# Patient Record
Sex: FEMALE | Race: WHITE | Employment: PART TIME | URBAN - METROPOLITAN AREA
[De-identification: names, ages, dates, MRNs, and addresses within clinical notes are randomized per-mention and may not be internally consistent; named-entity substitution may affect disease eponyms.]

---

## 2018-03-26 PROBLEM — J30.9 ALLERGIC RHINITIS: Status: ACTIVE | Noted: 2018-03-26

## 2018-03-26 PROBLEM — R42 DISEQUILIBRIUM: Status: ACTIVE | Noted: 2018-03-26

## 2019-04-22 ENCOUNTER — ANESTHESIA EVENT (OUTPATIENT)
Dept: SURGERY | Facility: HOSPITAL | Age: 59
DRG: 419 | End: 2019-04-22
Payer: COMMERCIAL

## 2019-04-22 ENCOUNTER — HOSPITAL ENCOUNTER (INPATIENT)
Facility: HOSPITAL | Age: 59
LOS: 2 days | Discharge: HOME | DRG: 419 | End: 2019-04-24
Attending: SURGERY | Admitting: SURGERY
Payer: COMMERCIAL

## 2019-04-22 DIAGNOSIS — K81.9 CHOLECYSTITIS: ICD-10-CM

## 2019-04-22 PROBLEM — K81.0 ACUTE CHOLECYSTITIS: Status: ACTIVE | Noted: 2019-04-22

## 2019-04-22 RX ORDER — KETOROLAC TROMETHAMINE 30 MG/ML
30 INJECTION, SOLUTION INTRAMUSCULAR; INTRAVENOUS EVERY 6 HOURS PRN
Status: DISCONTINUED | OUTPATIENT
Start: 2019-04-22 | End: 2019-04-23 | Stop reason: HOSPADM

## 2019-04-22 RX ORDER — MORPHINE SULFATE 4 MG/ML
2 INJECTION, SOLUTION INTRAMUSCULAR; INTRAVENOUS EVERY 2 HOUR PRN
Status: DISCONTINUED | OUTPATIENT
Start: 2019-04-22 | End: 2019-04-23 | Stop reason: HOSPADM

## 2019-04-22 RX ORDER — SODIUM CHLORIDE 9 MG/ML
INJECTION, SOLUTION INTRAVENOUS CONTINUOUS
Status: DISCONTINUED | OUTPATIENT
Start: 2019-04-22 | End: 2019-04-23 | Stop reason: HOSPADM

## 2019-04-22 RX ORDER — KETOROLAC TROMETHAMINE 15 MG/ML
15 INJECTION, SOLUTION INTRAMUSCULAR; INTRAVENOUS EVERY 6 HOURS PRN
Status: DISCONTINUED | OUTPATIENT
Start: 2019-04-22 | End: 2019-04-23 | Stop reason: HOSPADM

## 2019-04-22 RX ORDER — MORPHINE SULFATE 4 MG/ML
1 INJECTION, SOLUTION INTRAMUSCULAR; INTRAVENOUS EVERY 2 HOUR PRN
Status: DISCONTINUED | OUTPATIENT
Start: 2019-04-22 | End: 2019-04-23 | Stop reason: HOSPADM

## 2019-04-22 RX ORDER — ACETAMINOPHEN 325 MG/1
650 TABLET ORAL EVERY 6 HOURS PRN
Status: DISCONTINUED | OUTPATIENT
Start: 2019-04-22 | End: 2019-04-23 | Stop reason: HOSPADM

## 2019-04-22 RX ORDER — ONDANSETRON 2 MG/ML
4 INJECTION INTRAMUSCULAR; INTRAVENOUS EVERY 6 HOURS PRN
Status: DISCONTINUED | OUTPATIENT
Start: 2019-04-22 | End: 2019-04-23 | Stop reason: HOSPADM

## 2019-04-22 RX ORDER — MORPHINE SULFATE 4 MG/ML
4 INJECTION, SOLUTION INTRAMUSCULAR; INTRAVENOUS EVERY 2 HOUR PRN
Status: DISCONTINUED | OUTPATIENT
Start: 2019-04-22 | End: 2019-04-23 | Stop reason: HOSPADM

## 2019-04-22 RX ORDER — METOCLOPRAMIDE HYDROCHLORIDE 5 MG/ML
10 INJECTION INTRAMUSCULAR; INTRAVENOUS EVERY 8 HOURS PRN
Status: DISCONTINUED | OUTPATIENT
Start: 2019-04-22 | End: 2019-04-23 | Stop reason: HOSPADM

## 2019-04-22 NOTE — PLAN OF CARE
Problem: Patient/Family Goals  Goal: Patient/Family Long Term Goal  Description  Patient's Long Term Goal: \"to go home and be back to normal\"    Interventions:  - Offer PRN meds to keep pain and nausea controlled  - See additional Care Plan goals for s health  - Refer to Case Management Department for coordinating discharge planning if the patient needs post-hospital services based on physician/LIP order or complex needs related to functional status, cognitive ability or social support system  Outcome: P

## 2019-04-22 NOTE — PLAN OF CARE
NURSING ADMISSION NOTE      Patient admitted via w/c as a direct admit from Immediate Care  Oriented to room. Safety precautions initiated. Bed in low position. Call light in reach. Pt A/Ox4. VSS, afebrile.  Pt rates pain to abdomen 5/10, declinin

## 2019-04-23 ENCOUNTER — APPOINTMENT (OUTPATIENT)
Dept: GENERAL RADIOLOGY | Facility: HOSPITAL | Age: 59
DRG: 419 | End: 2019-04-23
Attending: SURGERY
Payer: COMMERCIAL

## 2019-04-23 ENCOUNTER — ANESTHESIA (OUTPATIENT)
Dept: SURGERY | Facility: HOSPITAL | Age: 59
DRG: 419 | End: 2019-04-23
Payer: COMMERCIAL

## 2019-04-23 PROCEDURE — 88304 TISSUE EXAM BY PATHOLOGIST: CPT | Performed by: SURGERY

## 2019-04-23 PROCEDURE — 0FT44ZZ RESECTION OF GALLBLADDER, PERCUTANEOUS ENDOSCOPIC APPROACH: ICD-10-PCS | Performed by: SURGERY

## 2019-04-23 PROCEDURE — 74300 X-RAY BILE DUCTS/PANCREAS: CPT | Performed by: SURGERY

## 2019-04-23 PROCEDURE — BF131ZZ FLUOROSCOPY OF GALLBLADDER AND BILE DUCTS USING LOW OSMOLAR CONTRAST: ICD-10-PCS | Performed by: SURGERY

## 2019-04-23 PROCEDURE — 85027 COMPLETE CBC AUTOMATED: CPT | Performed by: INTERNAL MEDICINE

## 2019-04-23 PROCEDURE — 80053 COMPREHEN METABOLIC PANEL: CPT | Performed by: INTERNAL MEDICINE

## 2019-04-23 PROCEDURE — 85610 PROTHROMBIN TIME: CPT | Performed by: INTERNAL MEDICINE

## 2019-04-23 RX ORDER — ONDANSETRON 2 MG/ML
4 INJECTION INTRAMUSCULAR; INTRAVENOUS AS NEEDED
Status: DISCONTINUED | OUTPATIENT
Start: 2019-04-23 | End: 2019-04-23 | Stop reason: HOSPADM

## 2019-04-23 RX ORDER — DEXAMETHASONE SODIUM PHOSPHATE 4 MG/ML
4 VIAL (ML) INJECTION AS NEEDED
Status: DISCONTINUED | OUTPATIENT
Start: 2019-04-23 | End: 2019-04-23 | Stop reason: HOSPADM

## 2019-04-23 RX ORDER — SODIUM CHLORIDE, SODIUM LACTATE, POTASSIUM CHLORIDE, CALCIUM CHLORIDE 600; 310; 30; 20 MG/100ML; MG/100ML; MG/100ML; MG/100ML
INJECTION, SOLUTION INTRAVENOUS CONTINUOUS
Status: DISCONTINUED | OUTPATIENT
Start: 2019-04-23 | End: 2019-04-23 | Stop reason: HOSPADM

## 2019-04-23 RX ORDER — HEPARIN SODIUM 5000 [USP'U]/ML
5000 INJECTION, SOLUTION INTRAVENOUS; SUBCUTANEOUS EVERY 12 HOURS SCHEDULED
Status: DISCONTINUED | OUTPATIENT
Start: 2019-04-23 | End: 2019-04-24

## 2019-04-23 RX ORDER — HYDROMORPHONE HYDROCHLORIDE 1 MG/ML
1.2 INJECTION, SOLUTION INTRAMUSCULAR; INTRAVENOUS; SUBCUTANEOUS EVERY 2 HOUR PRN
Status: DISCONTINUED | OUTPATIENT
Start: 2019-04-23 | End: 2019-04-24

## 2019-04-23 RX ORDER — HYDROMORPHONE HYDROCHLORIDE 1 MG/ML
0.8 INJECTION, SOLUTION INTRAMUSCULAR; INTRAVENOUS; SUBCUTANEOUS EVERY 2 HOUR PRN
Status: DISCONTINUED | OUTPATIENT
Start: 2019-04-23 | End: 2019-04-24

## 2019-04-23 RX ORDER — METOCLOPRAMIDE HYDROCHLORIDE 5 MG/ML
10 INJECTION INTRAMUSCULAR; INTRAVENOUS AS NEEDED
Status: DISCONTINUED | OUTPATIENT
Start: 2019-04-23 | End: 2019-04-23 | Stop reason: HOSPADM

## 2019-04-23 RX ORDER — HYDROMORPHONE HYDROCHLORIDE 1 MG/ML
0.4 INJECTION, SOLUTION INTRAMUSCULAR; INTRAVENOUS; SUBCUTANEOUS EVERY 2 HOUR PRN
Status: DISCONTINUED | OUTPATIENT
Start: 2019-04-23 | End: 2019-04-24

## 2019-04-23 RX ORDER — HYDROCODONE BITARTRATE AND ACETAMINOPHEN 5; 325 MG/1; MG/1
1 TABLET ORAL EVERY 4 HOURS PRN
Status: DISCONTINUED | OUTPATIENT
Start: 2019-04-23 | End: 2019-04-24

## 2019-04-23 RX ORDER — KETOROLAC TROMETHAMINE 15 MG/ML
15 INJECTION, SOLUTION INTRAMUSCULAR; INTRAVENOUS EVERY 6 HOURS PRN
Status: DISCONTINUED | OUTPATIENT
Start: 2019-04-23 | End: 2019-04-24

## 2019-04-23 RX ORDER — BUPIVACAINE HYDROCHLORIDE AND EPINEPHRINE 5; 5 MG/ML; UG/ML
INJECTION, SOLUTION EPIDURAL; INTRACAUDAL; PERINEURAL AS NEEDED
Status: DISCONTINUED | OUTPATIENT
Start: 2019-04-23 | End: 2019-04-23 | Stop reason: HOSPADM

## 2019-04-23 RX ORDER — CEFOXITIN 2 G/1
INJECTION, POWDER, FOR SOLUTION INTRAVENOUS
Status: DISCONTINUED | OUTPATIENT
Start: 2019-04-23 | End: 2019-04-23 | Stop reason: HOSPADM

## 2019-04-23 RX ORDER — NALOXONE HYDROCHLORIDE 0.4 MG/ML
80 INJECTION, SOLUTION INTRAMUSCULAR; INTRAVENOUS; SUBCUTANEOUS AS NEEDED
Status: DISCONTINUED | OUTPATIENT
Start: 2019-04-23 | End: 2019-04-23 | Stop reason: HOSPADM

## 2019-04-23 RX ORDER — HYDROMORPHONE HYDROCHLORIDE 1 MG/ML
0.4 INJECTION, SOLUTION INTRAMUSCULAR; INTRAVENOUS; SUBCUTANEOUS EVERY 5 MIN PRN
Status: DISCONTINUED | OUTPATIENT
Start: 2019-04-23 | End: 2019-04-23 | Stop reason: HOSPADM

## 2019-04-23 RX ORDER — KETOROLAC TROMETHAMINE 30 MG/ML
30 INJECTION, SOLUTION INTRAMUSCULAR; INTRAVENOUS EVERY 6 HOURS PRN
Status: DISCONTINUED | OUTPATIENT
Start: 2019-04-23 | End: 2019-04-24

## 2019-04-23 RX ORDER — ONDANSETRON 2 MG/ML
4 INJECTION INTRAMUSCULAR; INTRAVENOUS EVERY 6 HOURS PRN
Status: DISCONTINUED | OUTPATIENT
Start: 2019-04-23 | End: 2019-04-24

## 2019-04-23 RX ORDER — DEXTROSE, SODIUM CHLORIDE, AND POTASSIUM CHLORIDE 5; .45; .15 G/100ML; G/100ML; G/100ML
INJECTION INTRAVENOUS CONTINUOUS
Status: DISCONTINUED | OUTPATIENT
Start: 2019-04-23 | End: 2019-04-24

## 2019-04-23 RX ORDER — HYDROCODONE BITARTRATE AND ACETAMINOPHEN 5; 325 MG/1; MG/1
2 TABLET ORAL EVERY 4 HOURS PRN
Status: DISCONTINUED | OUTPATIENT
Start: 2019-04-23 | End: 2019-04-24

## 2019-04-23 RX ORDER — METOCLOPRAMIDE HYDROCHLORIDE 5 MG/ML
10 INJECTION INTRAMUSCULAR; INTRAVENOUS EVERY 6 HOURS PRN
Status: DISCONTINUED | OUTPATIENT
Start: 2019-04-23 | End: 2019-04-24

## 2019-04-23 NOTE — OPERATIVE REPORT
Report of Operation    Kal Bell Patient Status:  Inpatient    1960 MRN [id number]   [facility] SURGERY Attending Asia Kramer MD   [address] Day # 1 PCP Cresencio Patel MD         2019    Kal Bell    PRE-OPER removed and the cystic duct was divided. The cystic artery and its branches were identified clipped and divided. The gallbladder was removed from the liver edge by taking down its peritoneal attachments with hook electrocautery.  Gallbladder was then delive

## 2019-04-23 NOTE — CONSULTS
[facility]  Report of Consultation    Barceloneta Chimrichard Patient Status:  Inpatient    1960 MRN [id number]   [facility] 3NW-A Attending Ashanti Chaudhari MD   [facility] Day # 1 PCP Erma Stevens MD     2019    Reason for Consult Allergies    Current Medications:      Current Facility-Administered Medications:   •  0.9%  NaCl infusion, , Intravenous, Continuous  •  acetaminophen (TYLENOL) tab 650 mg, 650 mg, Oral, Q6H PRN  •  morphINE sulfate (PF) 4 MG/ML injection 1 mg, 1 mg, Intr 232 172.0   INR  --  1.05       Recent Labs   Lab 04/22/19  1327 04/23/19  0505    143   K 4.00 3.9    112   CO2 28.4 27.0   BUN 9.0 9   CREATSERUM 0.81 0.88   GLU 96 82   CA  --  8.4*       Recent Labs   Lab 04/22/19  1327 04/23/19  0505   ALT Problem List:    Patient Active Problem List:     Fatigue     Myalgia     Costochondritis     Fibromyalgia     Wrist sprain, left, subsequent encounter     Adhesive capsulitis of left shoulder     Rotator cuff syndrome, left     Disequilibrium     Cali

## 2019-04-23 NOTE — PLAN OF CARE
Problem: Patient/Family Goals  Goal: Patient/Family Long Term Goal  Description  Patient's Long Term Goal: WANTS TO GO HOME     Interventions:  - NPO   - See additional Care Plan goals for specific interventions  Outcome: Progressing  Goal: Patient/Famil physician/LIP order or complex needs related to functional status, cognitive ability or social support system  Outcome: Progressing     Problem: GASTROINTESTINAL - ADULT  Goal: Minimal or absence of nausea and vomiting  Description  INTERVENTIONS:  - Maint

## 2019-04-23 NOTE — PROGRESS NOTES
[facility] Hospitalist Progress Note     Tyra Mann Patient Status:  Inpatient    1960 MRN [id number]   [facility] 3NW-A Attending Sara Starkey MD   Hosp Day # 1 PCP Win Siddiqi MD     CC: follow up    SUBJECTIVE:  S/p lap Medication:HYDROmorphone HCl **OR** HYDROmorphone HCl **OR** HYDROmorphone HCl, ondansetron HCl, Metoclopramide HCl, ketorolac (TORADOL) injection **OR** ketorolac (TORADOL) injection, HYDROcodone-acetaminophen **OR** HYDROcodone-acetaminophen        Asses

## 2019-04-23 NOTE — H&P
[facility] Hospitalist H&P       CC: No chief complaint on file. PCP: Linda Benavides MD    History of Present Illness:  Pt is a 62year old female who presented to [facility] with RUQ abd pain. Onset yesterday. A/w nausea and more frequent stools.  Preceding shortness of breath, syncope.        OBJECTIVE:   04/22/19  1731   BP: 119/71   BP Location: Left arm   Pulse: 59   Resp: 18   Temp: 98.3 °F (36.8 °C)   TempSrc: Oral   SpO2: 96%         Wt Readings from Last 6 Encounters:  04/22/19 : 150 lb (68 kg)  04/22/ divisum. SPLEEN:  Calcified splenic granulomas. ADRENALS:  Normal. KIDNEYS/URETERS:  Normal. AORTA/VASCULAR:  Normal. LYMPH NODES:  No retroperitoneal or pelvic lymphadenopathy.  GASTROINTESTINAL TRACT:  Normal. PERITONEUM:Normal. PELVIC ORGANS:  Retroverte

## 2019-04-23 NOTE — PLAN OF CARE
Problem: PAIN - ADULT  Goal: Verbalizes/displays adequate comfort level or patient's stated pain goal  Description  INTERVENTIONS:  - Encourage pt to monitor pain and request assistance  - Assess pain using appropriate pain scale  - Administer analges medications  - Provide nonpharmacologic comfort measures as appropriate  - Advance diet as tolerated, if ordered  - Obtain nutritional consult as needed  - Evaluate fluid balance  Outcome: Progressing  Goal: Maintains or returns to baseline bowel function

## 2019-04-23 NOTE — ANESTHESIA PREPROCEDURE EVALUATION
PRE-OP EVALUATION    Patient Name: Aparna Bobby    Pre-op Diagnosis: Cholecystitis [K81.9]    Procedure(s):  LAPAROSCOPIC CHOLECYSTECTOMY POSSIBLE OPEN    Surgeon(s) and Role:     Riky Blas MD - Primary    Pre-op vitals reviewed. Temp: 98.3 °F (36. BIOPSY OF BREAST, NEEDLE CORE  1994    R Breast   • COLONOSCOPY, POSSIBLE BIOPSY, POSSIBLE POLYPECTOMY 33916 N/A 9/12/2015    Performed by Enrike Quintero MD at [address] [place] [address]    Missed AB   • NEEDLE BIOPSY RIGHT  age 36    No sc

## 2019-04-23 NOTE — PAYOR COMM NOTE
--------------  ADMISSION REVIEW     Payor: [facility]  Subscriber #:  [id number]  Authorization Number: [id number]    Admit date: 4/22/19  Admit time: 1627       Admitting Physician: Marissa Merritt MD  Attending Physician:  Annel Davey MD  Primary Car zosyn  - PRN pain/nausea control       4/23/19 -     SURGERY CONSULT    History of Present Illness:     Deanna Maria is a a(n) 62year old female.  Patient complains of lethargy and loose stools starting Thursday, after lunch on Easter she noted bloating/

## 2019-04-23 NOTE — PROGRESS NOTES
RECEIVED PATIENT FROM RECOVERY ROOM . ALERT AND ORIENT X 4 , ON ROOM AIR , VITALS STABLE , VERY SLEEPY , EASILY AWAKE . PLAN  OF CARE DISCUSSED AND ANSWERED ALL QUESTIONS . VERBALIZED UNDERSTANDING .  WILL CONTINUE TO MONITOR

## 2019-04-23 NOTE — ANESTHESIA POSTPROCEDURE EVALUATION
[address] [facility] Patient Status:  Inpatient   Age/Gender 62year old female MRN [id number]   Location [address] [facility] Attending Javier Mays MD   [facility] Day # 1 PCP Geoff Cross MD       Anesthesia Post-o

## 2019-04-24 VITALS
RESPIRATION RATE: 18 BRPM | OXYGEN SATURATION: 98 % | DIASTOLIC BLOOD PRESSURE: 51 MMHG | SYSTOLIC BLOOD PRESSURE: 93 MMHG | HEART RATE: 65 BPM | TEMPERATURE: 98 F | WEIGHT: 127.19 LBS | BODY MASS INDEX: 21 KG/M2

## 2019-04-24 PROCEDURE — 85027 COMPLETE CBC AUTOMATED: CPT | Performed by: INTERNAL MEDICINE

## 2019-04-24 RX ORDER — HYDROCODONE BITARTRATE AND ACETAMINOPHEN 5; 325 MG/1; MG/1
1 TABLET ORAL EVERY 4 HOURS PRN
Qty: 20 TABLET | Refills: 0 | Status: SHIPPED | OUTPATIENT
Start: 2019-04-24 | End: 2019-05-03

## 2019-04-24 NOTE — PLAN OF CARE
Problem: Patient/Family Goals  Goal: Patient/Family Long Term Goal  Description  Patient's Long Term Goal:       - See additional Care Plan goals for specific interventions  Outcome: Progressing     Problem: PAIN - ADULT  Goal: Verbalizes/displays [place] vomiting  Description  INTERVENTIONS:  - Maintain adequate hydration with IV or PO as ordered and tolerated  - Nasogastric tube to low intermittent suction as ordered  - Evaluate effectiveness of ordered antiemetic medications  - Provide nonpharmacologic c

## 2019-04-24 NOTE — PROGRESS NOTES
[facility]  Progress Note    Katiuksa Gear Patient Status:  Inpatient    1960 MRN [id number]   [facility] 3NW-A Attending Corwin Frausto, *   Hosp Day # 2 PCP Oskar Delcid MD     Subjective:    Patient tolerating P

## 2019-04-24 NOTE — DISCHARGE SUMMARY
General Medicine Discharge Summary     Patient ID:  Tyra Mann  62year old  [id number]  11/1/1960    Admit date: 4/22/2019    Discharge date and time:  4/24/19    Attending Physician: Meghna Xiong, *     Primary Care Physician: Katey Briseno IP CONSULT TO RESPIRATORY CARE    Radiology:  Xr Oper Cholangiogram (cpt=74300)    Result Date: 4/23/2019  PROCEDURE:  XR OPER CHOLANGIOGRAM (CPT=74300)  COMPARISON:  None.   INDICATIONS:  lap anny  DESCRIPTION OF PROCEDURE:  Witnessed verbal and written i No retroperitoneal or pelvic lymphadenopathy. GASTROINTESTINAL TRACT:  Normal. PERITONEUM:Normal. PELVIC ORGANS:  Retroverted uterus with intramural leiomyoma at the uterine fundus which is diffusely hypodense measuring 2.4 cm.  BLADDER:Normal. ABDOMINAL WA

## 2019-04-24 NOTE — PLAN OF CARE
Problem: Patient/Family Goals  Goal: Patient/Family Long Term Goal  Description  Patient's Long Term Goal: wants to go home     Interventions:  - diet advanced as tolerate   - See additional Care Plan goals for specific interventions  Outcome: Adequate f Department for coordinating discharge planning if the patient needs post-hospital services based on physician/LIP order or complex needs related to functional status, cognitive ability or social support system  Outcome: Adequate for Discharge     Problem:

## 2019-04-24 NOTE — PAYOR COMM NOTE
--------------  CONTINUED STAY REVIEW    Taylorzoya Aceveseker [facility]  Subscriber #:  [id number]  Authorization Number: [id number]    Admit date: 4/22/19  Admit time: 1627    Admitting Physician: Cachorro Starkey MD  Attending Physician:  Brandie Byers MD  Primary Ca

## 2019-04-24 NOTE — PROGRESS NOTES
NURSING DISCHARGE NOTE    Discharged Home via Wheelchair. Accompanied by Family member and Support staff  Belongings Taken by patient/family discussed d/c instructions with patient and family . Answered all questions . Verbalized understanding .  Pharm

## 2019-04-25 NOTE — PAYOR COMM NOTE
--------------  DISCHARGE REVIEW    Payor: [employer]  Subscriber #:  [id number]  Authorization Number: [id number]    Admit date: 4/22/19  Admit time:  1627  Discharge Date: 4/24/2019 11:40 AM     Admitting Physician: Cachorro Starkey MD  Primary Care Physicia

## 2019-04-28 ENCOUNTER — APPOINTMENT (OUTPATIENT)
Dept: CT IMAGING | Facility: HOSPITAL | Age: 59
DRG: 948 | End: 2019-04-28
Attending: EMERGENCY MEDICINE
Payer: COMMERCIAL

## 2019-04-28 ENCOUNTER — HOSPITAL ENCOUNTER (INPATIENT)
Facility: HOSPITAL | Age: 59
LOS: 1 days | Discharge: HOME | DRG: 948 | End: 2019-04-29
Attending: EMERGENCY MEDICINE | Admitting: INTERNAL MEDICINE
Payer: COMMERCIAL

## 2019-04-28 ENCOUNTER — APPOINTMENT (OUTPATIENT)
Dept: GENERAL RADIOLOGY | Facility: HOSPITAL | Age: 59
DRG: 948 | End: 2019-04-28
Attending: EMERGENCY MEDICINE
Payer: COMMERCIAL

## 2019-04-28 ENCOUNTER — APPOINTMENT (OUTPATIENT)
Dept: MRI IMAGING | Facility: HOSPITAL | Age: 59
DRG: 948 | End: 2019-04-28
Attending: EMERGENCY MEDICINE
Payer: COMMERCIAL

## 2019-04-28 DIAGNOSIS — R79.89 ELEVATED LFTS: Primary | ICD-10-CM

## 2019-04-28 DIAGNOSIS — R07.89 CHEST PAIN, ATYPICAL: ICD-10-CM

## 2019-04-28 PROBLEM — R73.9 HYPERGLYCEMIA: Status: ACTIVE | Noted: 2019-04-28

## 2019-04-28 PROCEDURE — 74160 CT ABDOMEN W/CONTRAST: CPT | Performed by: EMERGENCY MEDICINE

## 2019-04-28 PROCEDURE — 71275 CT ANGIOGRAPHY CHEST: CPT | Performed by: EMERGENCY MEDICINE

## 2019-04-28 PROCEDURE — 83690 ASSAY OF LIPASE: CPT | Performed by: EMERGENCY MEDICINE

## 2019-04-28 PROCEDURE — 99285 EMERGENCY DEPT VISIT HI MDM: CPT

## 2019-04-28 PROCEDURE — 76376 3D RENDER W/INTRP POSTPROCES: CPT | Performed by: EMERGENCY MEDICINE

## 2019-04-28 PROCEDURE — 96375 TX/PRO/DX INJ NEW DRUG ADDON: CPT

## 2019-04-28 PROCEDURE — 85025 COMPLETE CBC W/AUTO DIFF WBC: CPT

## 2019-04-28 PROCEDURE — 80053 COMPREHEN METABOLIC PANEL: CPT | Performed by: EMERGENCY MEDICINE

## 2019-04-28 PROCEDURE — 84484 ASSAY OF TROPONIN QUANT: CPT | Performed by: HOSPITALIST

## 2019-04-28 PROCEDURE — 93005 ELECTROCARDIOGRAM TRACING: CPT

## 2019-04-28 PROCEDURE — 84484 ASSAY OF TROPONIN QUANT: CPT | Performed by: EMERGENCY MEDICINE

## 2019-04-28 PROCEDURE — 71045 X-RAY EXAM CHEST 1 VIEW: CPT | Performed by: EMERGENCY MEDICINE

## 2019-04-28 PROCEDURE — 96374 THER/PROPH/DIAG INJ IV PUSH: CPT

## 2019-04-28 PROCEDURE — 93010 ELECTROCARDIOGRAM REPORT: CPT

## 2019-04-28 PROCEDURE — 85025 COMPLETE CBC W/AUTO DIFF WBC: CPT | Performed by: EMERGENCY MEDICINE

## 2019-04-28 PROCEDURE — 96361 HYDRATE IV INFUSION ADD-ON: CPT

## 2019-04-28 PROCEDURE — 74181 MRI ABDOMEN W/O CONTRAST: CPT | Performed by: EMERGENCY MEDICINE

## 2019-04-28 RX ORDER — SODIUM CHLORIDE 9 MG/ML
1000 INJECTION, SOLUTION INTRAVENOUS ONCE
Status: COMPLETED | OUTPATIENT
Start: 2019-04-28 | End: 2019-04-28

## 2019-04-28 RX ORDER — HYDROMORPHONE HYDROCHLORIDE 1 MG/ML
0.5 INJECTION, SOLUTION INTRAMUSCULAR; INTRAVENOUS; SUBCUTANEOUS ONCE
Status: COMPLETED | OUTPATIENT
Start: 2019-04-28 | End: 2019-04-28

## 2019-04-28 RX ORDER — HYDROMORPHONE HYDROCHLORIDE 1 MG/ML
0.5 INJECTION, SOLUTION INTRAMUSCULAR; INTRAVENOUS; SUBCUTANEOUS EVERY 30 MIN PRN
Status: ACTIVE | OUTPATIENT
Start: 2019-04-28 | End: 2019-04-28

## 2019-04-28 RX ORDER — LORAZEPAM 2 MG/ML
0.5 INJECTION INTRAMUSCULAR ONCE
Status: COMPLETED | OUTPATIENT
Start: 2019-04-28 | End: 2019-04-28

## 2019-04-28 RX ORDER — SODIUM CHLORIDE 9 MG/ML
INJECTION, SOLUTION INTRAVENOUS CONTINUOUS
Status: ACTIVE | OUTPATIENT
Start: 2019-04-28 | End: 2019-04-28

## 2019-04-28 RX ORDER — ACETAMINOPHEN 325 MG/1
650 TABLET ORAL EVERY 6 HOURS PRN
Status: DISCONTINUED | OUTPATIENT
Start: 2019-04-28 | End: 2019-04-29

## 2019-04-28 RX ORDER — MORPHINE SULFATE 4 MG/ML
4 INJECTION, SOLUTION INTRAMUSCULAR; INTRAVENOUS EVERY 2 HOUR PRN
Status: DISCONTINUED | OUTPATIENT
Start: 2019-04-28 | End: 2019-04-29

## 2019-04-28 RX ORDER — HEPARIN SODIUM 5000 [USP'U]/ML
5000 INJECTION, SOLUTION INTRAVENOUS; SUBCUTANEOUS EVERY 8 HOURS SCHEDULED
Status: DISCONTINUED | OUTPATIENT
Start: 2019-04-28 | End: 2019-04-29

## 2019-04-28 RX ORDER — IBUPROFEN 400 MG/1
400 TABLET ORAL EVERY 6 HOURS PRN
Status: DISCONTINUED | OUTPATIENT
Start: 2019-04-28 | End: 2019-04-29

## 2019-04-28 RX ORDER — FLUTICASONE PROPIONATE 50 MCG
1 SPRAY, SUSPENSION (ML) NASAL DAILY
Status: DISCONTINUED | OUTPATIENT
Start: 2019-04-29 | End: 2019-04-29

## 2019-04-28 RX ORDER — METOCLOPRAMIDE HYDROCHLORIDE 5 MG/ML
10 INJECTION INTRAMUSCULAR; INTRAVENOUS EVERY 8 HOURS PRN
Status: DISCONTINUED | OUTPATIENT
Start: 2019-04-28 | End: 2019-04-29

## 2019-04-28 RX ORDER — POLYETHYLENE GLYCOL 3350 17 G/17G
17 POWDER, FOR SOLUTION ORAL DAILY PRN
Status: DISCONTINUED | OUTPATIENT
Start: 2019-04-28 | End: 2019-04-29

## 2019-04-28 RX ORDER — SODIUM CHLORIDE 9 MG/ML
INJECTION, SOLUTION INTRAVENOUS CONTINUOUS
Status: DISCONTINUED | OUTPATIENT
Start: 2019-04-28 | End: 2019-04-29

## 2019-04-28 RX ORDER — SODIUM PHOSPHATE, DIBASIC AND SODIUM PHOSPHATE, MONOBASIC 7; 19 G/133ML; G/133ML
1 ENEMA RECTAL ONCE AS NEEDED
Status: DISCONTINUED | OUTPATIENT
Start: 2019-04-28 | End: 2019-04-29

## 2019-04-28 RX ORDER — MORPHINE SULFATE 4 MG/ML
1 INJECTION, SOLUTION INTRAMUSCULAR; INTRAVENOUS EVERY 2 HOUR PRN
Status: DISCONTINUED | OUTPATIENT
Start: 2019-04-28 | End: 2019-04-29

## 2019-04-28 RX ORDER — DOCUSATE SODIUM 100 MG/1
100 CAPSULE, LIQUID FILLED ORAL 2 TIMES DAILY
Status: DISCONTINUED | OUTPATIENT
Start: 2019-04-28 | End: 2019-04-29

## 2019-04-28 RX ORDER — ONDANSETRON 2 MG/ML
4 INJECTION INTRAMUSCULAR; INTRAVENOUS EVERY 6 HOURS PRN
Status: DISCONTINUED | OUTPATIENT
Start: 2019-04-28 | End: 2019-04-29

## 2019-04-28 RX ORDER — BISACODYL 10 MG
10 SUPPOSITORY, RECTAL RECTAL
Status: DISCONTINUED | OUTPATIENT
Start: 2019-04-28 | End: 2019-04-29

## 2019-04-28 RX ORDER — IBUPROFEN 400 MG/1
400 TABLET ORAL EVERY 8 HOURS PRN
Status: DISCONTINUED | OUTPATIENT
Start: 2019-04-28 | End: 2019-04-28

## 2019-04-28 RX ORDER — ONDANSETRON 2 MG/ML
4 INJECTION INTRAMUSCULAR; INTRAVENOUS ONCE
Status: COMPLETED | OUTPATIENT
Start: 2019-04-28 | End: 2019-04-28

## 2019-04-28 RX ORDER — MORPHINE SULFATE 4 MG/ML
2 INJECTION, SOLUTION INTRAMUSCULAR; INTRAVENOUS EVERY 2 HOUR PRN
Status: DISCONTINUED | OUTPATIENT
Start: 2019-04-28 | End: 2019-04-29

## 2019-04-28 RX ORDER — ASPIRIN 81 MG/1
324 TABLET, CHEWABLE ORAL ONCE
Status: COMPLETED | OUTPATIENT
Start: 2019-04-28 | End: 2019-04-28

## 2019-04-28 RX ORDER — ONDANSETRON 2 MG/ML
4 INJECTION INTRAMUSCULAR; INTRAVENOUS EVERY 4 HOURS PRN
Status: DISCONTINUED | OUTPATIENT
Start: 2019-04-28 | End: 2019-04-28 | Stop reason: SDUPTHER

## 2019-04-28 NOTE — H&P
[facility] Hospitalist H&P       CC: Patient presents with:  Chest Pain Angina (cardiovascular)       PCP: Bao Kilgore MD    History of Present Illness: Patient is a 62year old female with PMH sig for fibromyalgia is admitted for chest pain.   Pt had la Problem Relation Age of Onset   • Diabetes Mother    • Heart Disease Mother    • High Cholesterol Mother    • Heart Disorder Mother         heart failure   • Hypertension Mother    • Lipids Mother    • Obesity Mother    • Cancer Mother         lung cance 04/23/19  0505 04/28/19  0920    143 140   K 4.00 3.9 3.7    112 105   CO2 28.4 27.0 31.0   BUN 9.0 9 7   CREATSERUM 0.81 0.88 0.92   GLU 96 82 137*   CA  --  8.4* 9.0       Recent Labs   Lab 04/22/19  1327 04/23/19  0505 04/28/19  0920   ALT 2 COMPARISON: None. FINDINGS: LUNG BASES:  Normal. LIVER:  Calcified granulomas measuring up to 3 mm within segment 6. GALLBLADDER: Cholelithiasis. Distended gallbladder with mild pericholecystic edema suggesting acute cholecystitis. Arch Alyssa  BILIARY:  Mildly dilat TECHNIQUE:  CT angiography of the chest and CT abdomen were obtained post- injection of non-ionic intravenous contrast material. Multi-planar reformatted/3-D images were created to optimize visualization of vascular anatomy.  Dose reduction techniques were plugging. Clinical correlation and followup is recommended. Mild left hydronephrosis.    Dictated by: Rikki Coronado MD on 4/28/2019 at 11:54     Approved by: Rikki Coronado MD               ASSESSMENT / PLAN:     Upper abd pain/atypical chest pain   -s/p

## 2019-04-28 NOTE — CONSULTS
GASTROENTEROLOGY CONSULTATION  Maggi Giron MD    Department of Gastroenterology  [address] [place] [address] Patient Status:  Inpatient    1960 MRN [id number]   [facility] 3NW-A Attending Matt Patel MD   [facility] Day within the right mainstem bronchus which may represent some mucous. Atelectasis in the lung bases. Bilateral respiratory motion artifact. MEDIASTINUM:  No enlarged mediastinal or hilar adenopathy. Arch Alyssa CARDIAC:  No significant pericardial effusion.   PL seconds  TECHNOLOGIST TIME:  20 minutes  RADIATION DOSE (AIR KERMA PRODUCT):  1.55mGy        FINDINGS:    BILIARY TREE:  No obstruction or retained stone. Distended ducts. OTHER:  2 images obtained intraoperatively for intraoperative purposes.   For furth Fibromyalgia    • Fibromyalgia     2012     Past Surgical History:   Procedure Laterality Date   • BIOPSY OF BREAST, NEEDLE CORE  1994    R Breast   • COLONOSCOPY, POSSIBLE BIOPSY, POSSIBLE POLYPECTOMY 76296 N/A 9/12/2015    Performed by Vince Lopez GM/118ML enema 133 mL, 1 enema, Rectal, Once PRN  •  ondansetron HCl (ZOFRAN) injection 4 mg, 4 mg, Intravenous, Q6H PRN  •  Metoclopramide HCl (REGLAN) injection 10 mg, 10 mg, Intravenous, Q8H PRN    Review of Systems:  Gastrointestinal: See above  [facility] edema, cyanosis, or clubbing. Skin: Warm and dry.   Rectal: deferred  Laboratory Data:  Lab Results   Component Value Date    WBC 6.6 04/28/2019    HGB 13.5 04/28/2019    HCT 40.2 04/28/2019    .0 04/28/2019    CREATSERUM 0.92 04/28/2019    BUN 7 04

## 2019-04-28 NOTE — CONSULTS
[facility]  Report of Consultation    Huy Avalos Patient Status:  Emergency    1960 MRN [id number]   Location [address] [facility] Attending Jose Jeffrey MD   Hosp Day # 0 PCP Tony Wade MD     Reason for Consu smokeless tobacco. She reports that she drinks about 1.2 oz of alcohol per week. She reports that she does not use drugs.     Allergies:  No Known Allergies    Medications:    Current Facility-Administered Medications:   •  0.9%  NaCl infusion, 1,000 mL, In 140 04/28/2019    K 3.7 04/28/2019     04/28/2019    CO2 31.0 04/28/2019     04/28/2019    CA 9.0 04/28/2019    ALB 3.5 04/28/2019    ALKPHO 114 04/28/2019    BILT 0.5 04/28/2019    TP 7.2 04/28/2019     04/28/2019     04/28/2019 may represent some mucous. Atelectasis in the lung bases. Bilateral respiratory motion artifact. MEDIASTINUM:  No enlarged mediastinal or hilar adenopathy. Soren Maxwell CARDIAC:  No significant pericardial effusion.   PLEURA:  There are small bilateral pleural

## 2019-04-28 NOTE — ED PROVIDER NOTES
Patient Seen in: [facility] Emergency Department    History   Patient presents with:  Chest Pain Angina (cardiovascular)    Stated Complaint: sternal chest pain    HPI    Patient is a 60-year-old female presents the emergency room complaining of andra Current:BP (!) 138/91   Pulse 63   Temp 99 °F (37.2 °C) (Temporal)   Resp 18   Wt 57.7 kg   LMP 05/01/2015   SpO2 98%   BMI 20.53 kg/m²         Physical Exam    General: Patient is resting comfortably in no acute distress  HEENT: Normal cephalic atra Minimal blunting of the ankles. Report x-ray reviewed     Chest pain, after surgery. Patient's concern for possible PE.  EKG essentially normal with a normal troponin. Patient went for a CT scan of the chest and abdomen.   CT negative for pulmonary aneur

## 2019-04-28 NOTE — PLAN OF CARE
Problem: Patient/Family Goals  Goal: Patient/Family Long Term Goal  Description  Patient's Long Term Goal: follow up care  Interventions:  - stress test?  - labs: LFTs?  - follow up care GI or Cardiac?   - See additional Care Plan goals for specific inter stability  - Monitor arterial and/or venous puncture sites for bleeding and/or hematoma  - Assess quality of pulses, skin color and temperature  - Assess for signs of decreased coronary artery perfusion - ex.  Angina  - Evaluate fluid balance, assess for ed

## 2019-04-28 NOTE — ED INITIAL ASSESSMENT (HPI)
Pt presents to ER with a sternal left sided chest pain starting this morning at 0400. Pain \"sharp. \" Pain coming and going. Pt was able to go back to sleep. No SOB. No n/v/d. Yes HA. Pt notes a dryness to her throat. Moving all extremities.  Skin warm and

## 2019-04-29 VITALS
SYSTOLIC BLOOD PRESSURE: 111 MMHG | OXYGEN SATURATION: 94 % | BODY MASS INDEX: 21 KG/M2 | HEART RATE: 71 BPM | TEMPERATURE: 98 F | RESPIRATION RATE: 18 BRPM | WEIGHT: 127.19 LBS | DIASTOLIC BLOOD PRESSURE: 76 MMHG

## 2019-04-29 PROCEDURE — 85025 COMPLETE CBC W/AUTO DIFF WBC: CPT | Performed by: HOSPITALIST

## 2019-04-29 PROCEDURE — 80053 COMPREHEN METABOLIC PANEL: CPT | Performed by: HOSPITALIST

## 2019-04-29 NOTE — PROGRESS NOTES
1120 - patient eager to discharge. Dr. Abeba Porras paged. 1128 - call received back from Dr. Abeba Porras, ok to discharge before seeing the patient. No need for GI follow-up.

## 2019-04-29 NOTE — PLAN OF CARE
Patient alert and oriented x4. Denies need for pain medication at this time. Room air, pulse ox on. Complaining of pain when taking a deep breath. Encouraged IS use. No order for tele. Denies nausea. Tolerating diet. Passing gas. Voiding.  IV fluids infusin effectiveness of vasoactive medications to optimize hemodynamic stability  - Monitor arterial and/or venous puncture sites for bleeding and/or hematoma  - Assess quality of pulses, skin color and temperature  - Assess for signs of decreased coronary artery

## 2019-04-29 NOTE — PROGRESS NOTES
[facility]  Progress Note    Katiuska Layne Patient Status:  Inpatient    1960 MRN [id number]   [facility] 3NW-A Attending Aleksandra Wilkerson MD   [facility] Day # 1 PCP Oskar Delcid MD     Subjective:    Patient reports improv lfts end of week  F/u with Dr Frank Alvarez Friday  All questions answered    Parish Sheth, [address]  General Surgery  4/29/2019

## 2019-04-29 NOTE — PROGRESS NOTES
5632 - message left for on-call cardiologist at this time for new consult. 3105 - call received back from Dr. Cesilia Pride, no new orders at this time.      0680 - call received from Dr. Ewa Huerta, patient does not want cardiology consult, wishes to follow-up with

## 2019-04-29 NOTE — PLAN OF CARE
Patient complains of mild chest pain, worse when breathing. Treated with ibuprofen x 2 per patient request. Tolerating diet. Voiding freely. +gas/no bm overnight. Lap sites intact with steri strips. VSS. Safety maintained.

## 2019-04-29 NOTE — PROGRESS NOTES
[facility] Hospitalist Progress Note     PCP: Christy Roldan MD    SUBJECTIVE:  No CP, SOB, or N/V.    OBJECTIVE:  Temp:  [97.7 °F (36.5 °C)-99 °F (37.2 °C)] 97.9 °F (36.6 °C)  Pulse:  [57-74] 71  Resp:  [14-18] 18  BP: (111-150)/(67-91) 111/76    Intake/O • sodium chloride 100 mL/hr at 04/28/19 2144     morphINE sulfate **OR** morphINE sulfate **OR** morphINE sulfate, PEG 3350, magnesium hydroxide, bisacodyl, FLEET ENEMA, ondansetron HCl, Metoclopramide HCl, acetaminophen, ibuprofen       Assessment/Shiva

## 2019-04-29 NOTE — PROGRESS NOTES
Patient discharged home at this time. All discharge instructions were reviewed with the patient. All questions and concerns addressed. IV access was removed. No new prescriptions. Patient denied need for wheelchair.  Ambulated off unit with family, gait vijaya

## 2019-04-29 NOTE — PAYOR COMM NOTE
--------------  ADMISSION REVIEW     Payor: [employer]  Subscriber #:  [id number]  Authorization Number: [id number]    Admit date: 4/28/19  Admit time: [address]       Admitting Physician: Brady Talley MD  Attending Physician:  Brady Talley MD surgery. Patient's concern for possible PE.  EKG essentially normal with a normal troponin. Patient went for a CT scan of the chest and abdomen. CT negative for pulmonary aneurysm or aortic dissection.   Does have a likely mucous plugging which was discu pain   -s/p lap CCY  -elevated LFTs thus HIDA and MRCP ordered - Gen sx and GI consutled  -NPO until after studies, IVFs  -cmp again in am  -will order serial trops (initial was neg and EKG without ischemic changes) - if trops neg and if above GI w/u neg, stress testing - she does not want to do this here  -stop NSAID  -tele ordered     Elevated LFTs - downtrending without intervention  -GI consulted. Potentially from the GB fossa cautery vs NSAID use.   No evidence for CBD stones per MRCP and not likely bi

## 2019-04-30 NOTE — PAYOR COMM NOTE
--------------  DISCHARGE REVIEW    Payor: [employer]  Subscriber #:  [id number]  Authorization Number: [id number]    Admit date: 4/28/19  Admit time:  0537  Discharge Date: 4/29/2019 12:02 PM     Admitting Physician: Brandt Yen MD  Primary Care

## 2019-05-15 ENCOUNTER — APPOINTMENT (OUTPATIENT)
Dept: LAB | Age: 59
End: 2019-05-15
Attending: PHYSICIAN ASSISTANT
Payer: COMMERCIAL

## 2019-05-16 NOTE — DISCHARGE SUMMARY
General Medicine Discharge Summary     Patient ID:  Huy Avalos  62year old  11/1/1960    Admit date: 4/28/2019    Discharge date and time: 4/29/2019 12:02 PM     Attending Physician: Coral Matt DO by the surgeon and an intra-operative cholangiogram was obtained.    FLUOROSCOPY IMAGES OBTAINED:  2 FLUOROSCOPY TIME:  15 seconds TECHNOLOGIST TIME:  20 minutes RADIATION DOSE (AIR KERMA PRODUCT):  1.55mGy   FINDINGS:  BILIARY TREE:  No obstruction or josue edema suggesting acute cholecystitis. 2. Intra and extrahepatic biliary dilation. Correlation with liver function tests recommended to exclude distal common bile duct obstruction. 3. Pancreas divisum.     Mri Mrcp W/3d Only (PXI=17415/[id number])    Result Date: with deep breath and headache on and off for 3 days. Patient had her gallbladder removed on Tuesday. CONCLUSION:  Normal heart size and pulmonary vascularity. No focal infiltrate, consolidation, or pneumothorax.   Minimal blunting of both costophre SPLEEN:  Normal caliber. KIDNEYS:  Mild left hydronephrosis. ADRENALS:  Normal. AORTA/VASCULAR:  No aneurysm or dissection. RETROPERITONEUM:  No enlarged adenopathy. BOWEL/MESENTERY:  Visualized bowel loops are non distended.   There is a large amount of st

## 2019-08-16 PROCEDURE — 88175 CYTOPATH C/V AUTO FLUID REDO: CPT | Performed by: OBSTETRICS & GYNECOLOGY

## 2019-08-16 PROCEDURE — 87624 HPV HI-RISK TYP POOLED RSLT: CPT | Performed by: OBSTETRICS & GYNECOLOGY

## 2019-08-30 PROBLEM — M81.8 OTHER OSTEOPOROSIS WITHOUT CURRENT PATHOLOGICAL FRACTURE: Status: ACTIVE | Noted: 2019-08-30

## 2020-11-18 ENCOUNTER — HOSPITAL ENCOUNTER (EMERGENCY)
Facility: HOSPITAL | Age: 60
Discharge: HOME | End: 2020-11-18
Attending: EMERGENCY MEDICINE
Payer: COMMERCIAL

## 2020-11-18 ENCOUNTER — APPOINTMENT (OUTPATIENT)
Dept: GENERAL RADIOLOGY | Facility: HOSPITAL | Age: 60
End: 2020-11-18
Payer: COMMERCIAL

## 2020-11-18 VITALS
DIASTOLIC BLOOD PRESSURE: 71 MMHG | SYSTOLIC BLOOD PRESSURE: 119 MMHG | OXYGEN SATURATION: 97 % | BODY MASS INDEX: 21 KG/M2 | HEIGHT: 64 IN | WEIGHT: 123 LBS | TEMPERATURE: 98 F | RESPIRATION RATE: 16 BRPM | HEART RATE: 66 BPM

## 2020-11-18 DIAGNOSIS — S29.011A MUSCLE STRAIN OF CHEST WALL, INITIAL ENCOUNTER: Primary | ICD-10-CM

## 2020-11-18 LAB
ALBUMIN SERPL-MCNC: 3.6 G/DL (ref 3.4–5)
ALBUMIN/GLOB SERPL: 0.9 {RATIO} (ref 1–2)
ALP LIVER SERPL-CCNC: 78 U/L
ALT SERPL-CCNC: 29 U/L
ANION GAP SERPL CALC-SCNC: 4 MMOL/L (ref 0–18)
AST SERPL-CCNC: 24 U/L (ref 15–37)
BASOPHILS # BLD AUTO: 0.01 X10(3) UL (ref 0–0.2)
BASOPHILS NFR BLD AUTO: 0.2 %
BILIRUB SERPL-MCNC: 0.4 MG/DL (ref 0.1–2)
BUN BLD-MCNC: 12 MG/DL (ref 7–18)
BUN/CREAT SERPL: 15.8 (ref 10–20)
CALCIUM BLD-MCNC: 9.5 MG/DL (ref 8.5–10.1)
CHLORIDE SERPL-SCNC: 106 MMOL/L (ref 98–112)
CO2 SERPL-SCNC: 30 MMOL/L (ref 21–32)
CREAT BLD-MCNC: 0.76 MG/DL
D-DIMER: 0.4 UG/ML FEU (ref ?–0.6)
DEPRECATED RDW RBC AUTO: 40.7 FL (ref 35.1–46.3)
EOSINOPHIL # BLD AUTO: 0.02 X10(3) UL (ref 0–0.7)
EOSINOPHIL NFR BLD AUTO: 0.4 %
ERYTHROCYTE [DISTWIDTH] IN BLOOD BY AUTOMATED COUNT: 12.7 % (ref 11–15)
GLOBULIN PLAS-MCNC: 3.9 G/DL (ref 2.8–4.4)
GLUCOSE BLD-MCNC: 101 MG/DL (ref 70–99)
HCT VFR BLD AUTO: 41 %
HGB BLD-MCNC: 13.2 G/DL
IMM GRANULOCYTES # BLD AUTO: 0.01 X10(3) UL (ref 0–1)
IMM GRANULOCYTES NFR BLD: 0.2 %
LYMPHOCYTES # BLD AUTO: 1.6 X10(3) UL (ref 1–4)
LYMPHOCYTES NFR BLD AUTO: 30.1 %
M PROTEIN MFR SERPL ELPH: 7.5 G/DL (ref 6.4–8.2)
MCH RBC QN AUTO: 28.4 PG (ref 26–34)
MCHC RBC AUTO-ENTMCNC: 32.2 G/DL (ref 31–37)
MCV RBC AUTO: 88.2 FL
MONOCYTES # BLD AUTO: 0.41 X10(3) UL (ref 0.1–1)
MONOCYTES NFR BLD AUTO: 7.7 %
NEUTROPHILS # BLD AUTO: 3.26 X10 (3) UL (ref 1.5–7.7)
NEUTROPHILS # BLD AUTO: 3.26 X10(3) UL (ref 1.5–7.7)
NEUTROPHILS NFR BLD AUTO: 61.4 %
OSMOLALITY SERPL CALC.SUM OF ELEC: 290 MOSM/KG (ref 275–295)
PLATELET # BLD AUTO: 239 10(3)UL (ref 150–450)
POTASSIUM SERPL-SCNC: 3.4 MMOL/L (ref 3.5–5.1)
RBC # BLD AUTO: 4.65 X10(6)UL
SODIUM SERPL-SCNC: 140 MMOL/L (ref 136–145)
TROPONIN I SERPL-MCNC: <0.045 NG/ML (ref ?–0.04)
WBC # BLD AUTO: 5.3 X10(3) UL (ref 4–11)

## 2020-11-18 PROCEDURE — 93010 ELECTROCARDIOGRAM REPORT: CPT

## 2020-11-18 PROCEDURE — 85025 COMPLETE CBC W/AUTO DIFF WBC: CPT

## 2020-11-18 PROCEDURE — 84484 ASSAY OF TROPONIN QUANT: CPT

## 2020-11-18 PROCEDURE — 99284 EMERGENCY DEPT VISIT MOD MDM: CPT

## 2020-11-18 PROCEDURE — 93005 ELECTROCARDIOGRAM TRACING: CPT

## 2020-11-18 PROCEDURE — 85025 COMPLETE CBC W/AUTO DIFF WBC: CPT | Performed by: EMERGENCY MEDICINE

## 2020-11-18 PROCEDURE — 80053 COMPREHEN METABOLIC PANEL: CPT

## 2020-11-18 PROCEDURE — 80053 COMPREHEN METABOLIC PANEL: CPT | Performed by: EMERGENCY MEDICINE

## 2020-11-18 PROCEDURE — 71045 X-RAY EXAM CHEST 1 VIEW: CPT | Performed by: EMERGENCY MEDICINE

## 2020-11-18 PROCEDURE — 85379 FIBRIN DEGRADATION QUANT: CPT | Performed by: EMERGENCY MEDICINE

## 2020-11-18 PROCEDURE — 36415 COLL VENOUS BLD VENIPUNCTURE: CPT

## 2020-11-18 PROCEDURE — 84484 ASSAY OF TROPONIN QUANT: CPT | Performed by: EMERGENCY MEDICINE

## 2020-11-18 PROCEDURE — 99285 EMERGENCY DEPT VISIT HI MDM: CPT

## 2020-11-18 RX ORDER — FAMOTIDINE 10 MG
10 TABLET ORAL 2 TIMES DAILY
COMMUNITY

## 2020-11-18 NOTE — ED INITIAL ASSESSMENT (HPI)
covid symptoms started 11/3. Was positive. Still with cough and productive cough.   Felt like pulled muscle from cough talked to pmd and sent in for evaluation

## 2020-11-18 NOTE — ED PROVIDER NOTES
Patient Seen in: [facility] Emergency Department      History   Patient presents with:  Chest Pain Angina    Stated Complaint: chest pain    HPI    Patient is a 43-year-old female presents for evaluation of intermittent left-sided chest pain for the mid chest wall. No crepitus or deformity. Pulmonary:      Effort: Pulmonary effort is normal.      Breath sounds: Normal breath sounds. Abdominal:      General: Bowel sounds are normal.      Palpations: Abdomen is soft.    Musculoskeletal: Normal range pleural effusion or pneumothorax. IMPRESSION: Unremarkable portable chest radiograph.     Dictated by (CST): Ramila Salmeron MD on 11/18/2020 at 1:39 PM     Finalized by (CST): Ramila Salmeron MD on 11/18/2020 at 1:40 PM             [facility]      60-year-o

## 2020-11-18 NOTE — ED NOTES
DC instructions handed to pt. No distress noted. Pt denies any further needs. Pt thanks staff for care.

## 2020-11-19 LAB
ATRIAL RATE: 81 BPM
P AXIS: 70 DEGREES
P-R INTERVAL: 170 MS
Q-T INTERVAL: 360 MS
QRS DURATION: 80 MS
QTC CALCULATION (BEZET): 418 MS
R AXIS: 62 DEGREES
T AXIS: 38 DEGREES
VENTRICULAR RATE: 81 BPM

## 2021-01-05 PROBLEM — C44.612 BASAL CELL CARCINOMA (BCC) OF RIGHT UPPER ARM: Status: ACTIVE | Noted: 2021-01-05

## 2021-07-12 PROBLEM — R07.89 CHEST PAIN, ATYPICAL: Status: RESOLVED | Noted: 2019-04-28 | Resolved: 2021-07-12

## (undated) DEVICE — ZIPWIRE GUIDEWIRE .038X150 STR

## (undated) DEVICE — SUTURE VICRYL 0 UR-6

## (undated) DEVICE — DRAPE C-ARM UNIVERSAL

## (undated) DEVICE — ENDO MINI-SHEARS 5MM STD LENGT

## (undated) DEVICE — DISPOSABLE LAPAROSCOPIC CLIP APPLIER WITH 20 CLIPS.: Brand: EPIX® UNIVERSAL CLIP APPLIER

## (undated) DEVICE — ENDOPATH ULTRA VERESS INSUFFLATION NEEDLES WITH LUER LOCK CONNECTORS: Brand: ENDOPATH

## (undated) DEVICE — STERILE POLYISOPRENE POWDER-FREE SURGICAL GLOVES: Brand: PROTEXIS

## (undated) DEVICE — TROCAR: Brand: KII SHIELDED BLADED ACCESS SYSTEM

## (undated) DEVICE — TROCAR: Brand: KII FIOS FIRST ENTRY

## (undated) DEVICE — LAP CHOLE/APPY CDS-LF: Brand: MEDLINE INDUSTRIES, INC.

## (undated) DEVICE — CHLORAPREP 26ML APPLICATOR

## (undated) DEVICE — UNDYED BRAIDED (POLYGLACTIN 910), SYNTHETIC ABSORBABLE SUTURE: Brand: COATED VICRYL

## (undated) DEVICE — KENDALL SCD EXPRESS SLEEVES, KNEE LENGTH, MEDIUM: Brand: KENDALL SCD

## (undated) DEVICE — SOL  .9 1000ML BTL

## (undated) DEVICE — TROCAR: Brand: KII® SLEEVE

## (undated) DEVICE — #11 STERILE BLADE: Brand: POLYMER COATED BLADES

## (undated) DEVICE — Device

## (undated) NOTE — LETTER
Yuli Ly[address]  [place], [address] [facility]    Consent for Operation  Date: __________________                                Time: _______________    1.  I authorize the performance upon Huy Avalos the following operation:  Procedure( procedure has been videotaped, the surgeon will obtain the original videotape. The hospital will not be responsible for storage or maintenance of this tape.   7. For the purpose of advancing medical education, I consent to the admittance of observers to the STATEMENTS REQUIRING INSERTION OR COMPLETION WERE FILLED IN.     Signature of Patient:   ___________________________    When the patient is a minor or mentally incompetent to give consent:  Signature of person authorized to consent for patient: ____________ supplements, and pills I can buy without a prescription (including street drugs/illegal medications). Failure to inform my anesthesiologist about these medicines may increase my risk of anesthetic complications. iv.  If I am allergic to anything or have ha Anesthesiologist Signature     Date   Time  I have discussed the procedure and information above with the patient (or patient’s representative) and answered their questions. The patient or their representative has agreed to have anesthesia services.     ___